# Patient Record
Sex: MALE | Race: WHITE
[De-identification: names, ages, dates, MRNs, and addresses within clinical notes are randomized per-mention and may not be internally consistent; named-entity substitution may affect disease eponyms.]

---

## 2022-12-25 ENCOUNTER — HOSPITAL ENCOUNTER (INPATIENT)
Dept: HOSPITAL 76 - ED | Age: 32
LOS: 4 days | Discharge: HOME | DRG: 341 | End: 2022-12-29
Attending: SURGERY | Admitting: SURGERY
Payer: COMMERCIAL

## 2022-12-25 DIAGNOSIS — F32.A: ICD-10-CM

## 2022-12-25 DIAGNOSIS — Z79.899: ICD-10-CM

## 2022-12-25 DIAGNOSIS — Z20.822: ICD-10-CM

## 2022-12-25 DIAGNOSIS — G47.30: ICD-10-CM

## 2022-12-25 DIAGNOSIS — M54.9: ICD-10-CM

## 2022-12-25 DIAGNOSIS — I10: ICD-10-CM

## 2022-12-25 DIAGNOSIS — G89.29: ICD-10-CM

## 2022-12-25 DIAGNOSIS — J81.0: ICD-10-CM

## 2022-12-25 DIAGNOSIS — F41.9: ICD-10-CM

## 2022-12-25 DIAGNOSIS — K35.80: Primary | ICD-10-CM

## 2022-12-25 LAB
ALBUMIN DIAFP-MCNC: 4.3 G/DL (ref 3.2–5.5)
ALBUMIN/GLOB SERPL: 1.1 {RATIO} (ref 1–2.2)
ALP SERPL-CCNC: 90 IU/L (ref 42–121)
ALT SERPL W P-5'-P-CCNC: 68 IU/L (ref 10–60)
ANION GAP SERPL CALCULATED.4IONS-SCNC: 8 MMOL/L (ref 6–13)
AST SERPL W P-5'-P-CCNC: 32 IU/L (ref 10–42)
BASOPHILS NFR BLD AUTO: 0 10^3/UL (ref 0–0.1)
BASOPHILS NFR BLD AUTO: 0.6 %
BILIRUB BLD-MCNC: 0.6 MG/DL (ref 0.2–1)
BUN SERPL-MCNC: 13 MG/DL (ref 6–20)
CALCIUM UR-MCNC: 8.9 MG/DL (ref 8.5–10.3)
CHLORIDE SERPL-SCNC: 102 MMOL/L (ref 101–111)
CLARITY UR REFRACT.AUTO: CLEAR
CO2 SERPL-SCNC: 27 MMOL/L (ref 21–32)
CREAT SERPLBLD-SCNC: 0.7 MG/DL (ref 0.6–1.2)
EOSINOPHIL # BLD AUTO: 0.3 10^3/UL (ref 0–0.7)
EOSINOPHIL NFR BLD AUTO: 3.7 %
ERYTHROCYTE [DISTWIDTH] IN BLOOD BY AUTOMATED COUNT: 12.8 % (ref 12–15)
GFRSERPLBLD MDRD-ARVRAT: 130 ML/MIN/{1.73_M2} (ref 89–?)
GLOBULIN SER-MCNC: 3.8 G/DL (ref 2.1–4.2)
GLUCOSE SERPL-MCNC: 106 MG/DL (ref 70–100)
GLUCOSE UR QL STRIP.AUTO: NEGATIVE MG/DL
HCT VFR BLD AUTO: 41.1 % (ref 42–52)
HGB UR QL STRIP: 14.1 G/DL (ref 14–18)
KETONES UR QL STRIP.AUTO: NEGATIVE MG/DL
LIPASE SERPL-CCNC: 35 U/L (ref 22–51)
LYMPHOCYTES # SPEC AUTO: 2 10^3/UL (ref 1.5–3.5)
LYMPHOCYTES NFR BLD AUTO: 29.2 %
MCH RBC QN AUTO: 29.4 PG (ref 27–31)
MCHC RBC AUTO-ENTMCNC: 34.3 G/DL (ref 32–36)
MCV RBC AUTO: 85.8 FL (ref 80–94)
MONOCYTES # BLD AUTO: 0.6 10^3/UL (ref 0–1)
MONOCYTES NFR BLD AUTO: 8.3 %
NEUTROPHILS # BLD AUTO: 4 10^3/UL (ref 1.5–6.6)
NEUTROPHILS # SNV AUTO: 7 X10^3/UL (ref 4.8–10.8)
NEUTROPHILS NFR BLD AUTO: 57.5 %
NITRITE UR QL STRIP.AUTO: NEGATIVE
NRBC # BLD AUTO: 0 /100WBC
NRBC # BLD AUTO: 0 X10^3/UL
PDW BLD AUTO: 9.8 FL (ref 7.4–11.4)
PH UR STRIP.AUTO: 7 PH (ref 5–7.5)
PLATELET # BLD: 205 10^3/UL (ref 130–450)
POTASSIUM SERPL-SCNC: 3.6 MMOL/L (ref 3.5–5)
PROT SPEC-MCNC: 8.1 G/DL (ref 6.7–8.2)
PROT UR STRIP.AUTO-MCNC: NEGATIVE MG/DL
RBC # UR STRIP.AUTO: NEGATIVE /UL
RBC MAR: 4.79 10^6/UL (ref 4.7–6.1)
SODIUM SERPLBLD-SCNC: 137 MMOL/L (ref 135–145)
SP GR UR STRIP.AUTO: 1.01 (ref 1–1.03)
UROBILINOGEN UR QL STRIP.AUTO: (no result) E.U./DL
UROBILINOGEN UR STRIP.AUTO-MCNC: NEGATIVE MG/DL

## 2022-12-25 PROCEDURE — 96365 THER/PROPH/DIAG IV INF INIT: CPT

## 2022-12-25 PROCEDURE — 81003 URINALYSIS AUTO W/O SCOPE: CPT

## 2022-12-25 PROCEDURE — 96372 THER/PROPH/DIAG INJ SC/IM: CPT

## 2022-12-25 PROCEDURE — 87086 URINE CULTURE/COLONY COUNT: CPT

## 2022-12-25 PROCEDURE — 80053 COMPREHEN METABOLIC PANEL: CPT

## 2022-12-25 PROCEDURE — 96374 THER/PROPH/DIAG INJ IV PUSH: CPT

## 2022-12-25 PROCEDURE — 71046 X-RAY EXAM CHEST 2 VIEWS: CPT

## 2022-12-25 PROCEDURE — 36600 WITHDRAWAL OF ARTERIAL BLOOD: CPT

## 2022-12-25 PROCEDURE — 99285 EMERGENCY DEPT VISIT HI MDM: CPT

## 2022-12-25 PROCEDURE — 87635 SARS-COV-2 COVID-19 AMP PRB: CPT

## 2022-12-25 PROCEDURE — 81001 URINALYSIS AUTO W/SCOPE: CPT

## 2022-12-25 PROCEDURE — 99284 EMERGENCY DEPT VISIT MOD MDM: CPT

## 2022-12-25 PROCEDURE — 0DTJ4ZZ RESECTION OF APPENDIX, PERCUTANEOUS ENDOSCOPIC APPROACH: ICD-10-PCS | Performed by: SURGERY

## 2022-12-25 PROCEDURE — 83690 ASSAY OF LIPASE: CPT

## 2022-12-25 PROCEDURE — 85025 COMPLETE CBC W/AUTO DIFF WBC: CPT

## 2022-12-25 PROCEDURE — 74177 CT ABD & PELVIS W/CONTRAST: CPT

## 2022-12-25 PROCEDURE — 71045 X-RAY EXAM CHEST 1 VIEW: CPT

## 2022-12-25 PROCEDURE — 96375 TX/PRO/DX INJ NEW DRUG ADDON: CPT

## 2022-12-25 PROCEDURE — 36415 COLL VENOUS BLD VENIPUNCTURE: CPT

## 2022-12-25 PROCEDURE — 82803 BLOOD GASES ANY COMBINATION: CPT

## 2022-12-25 NOTE — ANESTHESIA
Pre-Anesthesia VS, & Labs





- Diagnosis





acute appendicitis





- Procedure





Lap Appy


Vital Signs: 





                                        











Temp Pulse Resp BP Pulse Ox O2 Flow Rate


 


 36.8 C   79   18   151/93 H  99    


 


 12/25/22 18:52  12/25/22 18:52  12/25/22 18:52  12/25/22 18:52  12/25/22 18:52 

 











Height: 5 ft 9 in


Weight (kg): 142.428 kg


Body Mass Index: 46.3


BMI Classification: Morbidly Obese





- NPO


>8 hours





- Lab Results


Current Lab Results: 





Laboratory Tests





12/25/22 12:20: Sodium 137, Potassium 3.6, Chloride 102, Carbon Dioxide 27, 

Anion Gap 8.0, BUN 13, Creatinine 0.7, Estimated GFR (MDRD) 130, Glucose 106 H, 

Calcium 8.9, Total Bilirubin 0.6, AST 32, ALT 68 H, Alkaline Phosphatase 90, 

Total Protein 8.1, Albumin 4.3, Globulin 3.8, Albumin/Globulin Ratio 1.1, Lipase

35


12/25/22 12:20: WBC 7.0, RBC 4.79, Hgb 14.1, Hct 41.1 L, MCV 85.8, MCH 29.4, 

MCHC 34.3, RDW 12.8, Plt Count 205, MPV 9.8, Neut # (Auto) 4.0, Lymph # (Auto) 

2.0, Mono # (Auto) 0.6, Eos # (Auto) 0.3, Baso # (Auto) 0.0, Absolute Nucleated 

RBC 0.00, Nucleated RBC % 0.0








Lab results reviewed: Yes


Fish Bones: 


                                 12/25/22 12:20





                                 12/25/22 12:20





Home Medications and Allergies


Home Medications: 


Ambulatory Orders





Escitalopram [Lexapro] 10 mg PO DAILY 12/25/22 


amLODIPine [Norvasc] 5 mg PO DAILY 12/25/22 











                                        





Escitalopram [Lexapro] 10 mg PO DAILY 12/25/22 


amLODIPine [Norvasc] 5 mg PO DAILY 12/25/22 








Allergies/Adverse Reactions: 


                                    Allergies











Allergy/AdvReac Type Severity Reaction Status Date / Time


 


No Known Drug Allergies Allergy   Verified 12/25/22 12:04














Anes History & Medical History





- Anesthetic History


Family history of Anesthesia Complications: Denies


Family history of Malignant Hyperthermia: Denies





- Medical History


Cardiovascular: reports: Hypertension


Pulmonary: reports: Sleep apnea, CPAP use


Gastrointestinal: reports: Other (fatty liver)


Urinary: reports: None


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


Musculoskeletal: reports: Chronic back pain


Endocrine/Autoimmune: reports: None


Blood Disorders: reports: None


Skin: reports: None


Smoking Status: Current some day smoker (smokes cigars 3-4 times per week.)


Psychosocial: reports: Depression


History of Cancer?: No


Other Past Medical History: fatty liver, "mass" on cerebellum





- Surgical History


General: reports: Colonoscopy


Other Past Surgical History: wisdom teeth





Exam


General: Alert, Oriented x3, Cooperative, No acute distress


Dental: WNL


Mouth Opening: 3 Fingerbreadth


Neck Mobility: Normal


Mallampati classification: III


Thyromental Distance: 4-6 cm


Mental/Cognitive Status: Alert/Oriented X3, Normal for patient





Plan


Anesthesia Type: General


Consent for Procedure(s) Verified and Reviewed: Yes


Code Status: Attempt Resuscitation


ASA classification: 3-Severe systemic disease


Is this case an emergency?: Yes

## 2022-12-25 NOTE — ED PHYSICIAN DOCUMENTATION
History of Present Illness





- Stated complaint


Stated Complaint: ABD PX





- Chief complaint


Chief Complaint: Abd Pain





- Additonal information


Additional information: 





32-year-old male presents emergency department for evaluation of 24 hours pain 

in his right lower quadrant with some radiation to the right groin and testicle.

 He has had some mild diarrhea.  Some nausea no vomiting.  No fevers.  No 

urinary symptoms or hematuria.  No history of similar.  No pertinent past 

surgical history.





Patient does have a history of hypertension for which he takes medication as 

well as Lexapro for anxiety/depression





Review of Systems


Constitutional: reports: Reviewed and negative


Throat: reports: Reviewed and negative


Cardiac: reports: Reviewed and negative


Respiratory: reports: Reviewed and negative


GI: reports: Abdominal Pain, Nausea.  denies: Vomiting


: reports: Reviewed and negative


Skin: reports: Reviewed and negative





PD PAST MEDICAL HISTORY





- Present Medications


Home Medications: 


                                Ambulatory Orders











 Medication  Instructions  Recorded  Confirmed


 


Escitalopram [Lexapro] 10 mg PO DAILY 12/25/22 12/25/22


 


HYDROcod/ACETAM 5/325 [Norco 5/325] 1 each PO Q6H PRN #25 tablet 12/25/22 


 


amLODIPine [Norvasc] 5 mg PO DAILY 12/25/22 12/25/22














- Allergies


Allergies/Adverse Reactions: 


                                    Allergies











Allergy/AdvReac Type Severity Reaction Status Date / Time


 


No Known Drug Allergies Allergy   Verified 12/25/22 12:04














PD ED PE NORMAL





- General


General: Alert and oriented X 3, No acute distress





- HEENT


HEENT: PERRL





- Cardiac


Cardiac: RRR, No murmur





- Respiratory


Respiratory: No respiratory distress, Clear bilaterally





- Abdomen


Abdomen: Normal bowel sounds, Soft.  No: Non tender (Mild tenderness elicited 

very low in the right lower quadrant without guarding or rebound.  Unremarkable 

male  exam.  Abdominal exam was limited somewhat by body habitus)





- Back


Back: No CVA TTP, No spinal TTP





- Derm


Derm: Normal color, Warm and dry





- Extremities


Extremities: No deformity, No tenderness to palpate





- Neuro


Neuro: Alert and oriented X 3, CNs 2-12 intact


Eye Opening: Spontaneous


Motor: Obeys Commands


Verbal: Oriented


GCS Score: 15





Results





- Vitals


Vitals: 


                               Vital Signs - 24 hr











  12/25/22 12/25/22





  12:00 15:37


 


Temperature 36.8 C 


 


Heart Rate 82 80


 


Respiratory 18 18





Rate  


 


Blood Pressure 159/93 H 166/97 H


 


O2 Saturation 99 100








                                     Oxygen











O2 Source                      Room air

















- Labs


Labs: 


                                Laboratory Tests











  12/25/22 12/25/22 12/25/22





  12:06 12:20 12:20


 


WBC   7.0 


 


RBC   4.79 


 


Hgb   14.1 


 


Hct   41.1 L 


 


MCV   85.8 


 


MCH   29.4 


 


MCHC   34.3 


 


RDW   12.8 


 


Plt Count   205 


 


MPV   9.8 


 


Neut # (Auto)   4.0 


 


Lymph # (Auto)   2.0 


 


Mono # (Auto)   0.6 


 


Eos # (Auto)   0.3 


 


Baso # (Auto)   0.0 


 


Absolute Nucleated RBC   0.00 


 


Nucleated RBC %   0.0 


 


Sodium    137


 


Potassium    3.6


 


Chloride    102


 


Carbon Dioxide    27


 


Anion Gap    8.0


 


BUN    13


 


Creatinine    0.7


 


Estimated GFR (MDRD)    130


 


Glucose    106 H


 


Calcium    8.9


 


Total Bilirubin    0.6


 


AST    32


 


ALT    68 H


 


Alkaline Phosphatase    90


 


Total Protein    8.1


 


Albumin    4.3


 


Globulin    3.8


 


Albumin/Globulin Ratio    1.1


 


Lipase    35


 


Urine Color  YELLOW  


 


Urine Clarity  CLEAR  


 


Urine pH  7.0  


 


Ur Specific Gravity  1.015  


 


Urine Protein  NEGATIVE  


 


Urine Glucose (UA)  NEGATIVE  


 


Urine Ketones  NEGATIVE  


 


Urine Occult Blood  NEGATIVE  


 


Urine Nitrite  NEGATIVE  


 


Urine Bilirubin  NEGATIVE  


 


Urine Urobilinogen  0.2 (NORMAL)  


 


Ur Leukocyte Esterase  NEGATIVE  


 


Ur Microscopic Review  NOT INDICATED  


 


Urine Culture Comments  NOT INDICATED  














- Rads (name of study)


  ** CT abd


Radiology: Final report received (Mild early appendicitis.  No findings of 

perforation or abscess are seen)





PD Medical Decision Making





- ED course


Complexity details: reviewed results, re-evaluated patient, considered 

differential, d/w patient, d/w consultant (Abram)


ED course: 





32-year-old male presents to the emergency department for evaluation of acute 

right lower quadrant abdominal pain with radiation to the right groin and 

testicle.  Symptoms began yesterday.  Some nausea no vomiting.  No fevers.





On exam he does have mildly tender right lower quadrant.  Exam was somewhat 

limited due to body habitus.  We did obtain a CBC and electrolytes here in the 

emergency department and no acute worrisome findings were seen such as 

leukocytosis or renal derangement.





We did proceed to do a CT of the abdomen looking for renal or ureter colic 

versus possible acute appendectomy.  The radiologist is interpreted the findings

 as early appendicitis.  I discussed the CT finding with Dr. Parish Valverde surgeon 

on-call.  The plan is to take the patient to the operating room later this lazaro castillo.  I will administer a dose of Zosyn.  Dr. Valverde has requested that we make a

 prepack of pain medication available to the patient postoperatively which I 

have also prescribed.  Patient is NPO.  Patient is aware of the plan and 

findings.





Further care to be dictated by the surgical team.





Departure





- Departure


Disposition: ED Transfer to [facility]


Clinical Impression: 


Acute appendicitis


Qualifiers:


 Acute appendicitis type: other Qualified Code(s): K35.890 - Other acute 

appendicitis without perforation or gangrene; K35.89 - Other acute appendicitis





Prescriptions: 


HYDROcod/ACETAM 5/325 [Norco 5/325] 1 each PO Q6H PRN #25 tablet


 PRN Reason: Pain

## 2022-12-25 NOTE — HISTORY & PHYSICAL EXAMINATION
Chief Complaint





- Chief Complaint


Chief Complaint: right lower quadrant pain x 24 hours





History of Present Illness





- Admitted From


Admitted From:: ED





- History Obtained From


Records Reviewed: yes


History obtained from: pt


Exam Limitations: none





- History of Present Illness


HPI Comment/Other: 





no n/v.  last pm this am.  ct scan early appendicitis





History





- Past Medical History


Cardiovascular: reports: Hypertension


Respiratory: reports: Sleep apnea, CPAP use


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


Endocrine/Autoimmune: reports: None


GI: reports: Other (fatty liver)


: reports: None


Psych: reports: Depression


Musculoskeletal: reports: Chronic back pain


Derm: reports: None


Other Past Medical History: fatty liver, "mass" on cerebellum





- Past Surgical History


General: reports: Colonoscopy


Other past surgical history: wisdom teeth





- POLST


Patient has POLST: No





Meds/Allgy





- Home Medications


Home Medications: 


                                Ambulatory Orders











 Medication  Instructions  Recorded  Confirmed


 


Escitalopram [Lexapro] 10 mg PO DAILY 12/25/22 12/25/22


 


HYDROcod/ACETAM 5/325 [Norco 5/325] 1 each PO Q6H PRN #25 tablet 12/25/22 


 


amLODIPine [Norvasc] 5 mg PO DAILY 12/25/22 12/25/22














- Allergies


Allergies/Adverse Reactions: 


                                    Allergies











Allergy/AdvReac Type Severity Reaction Status Date / Time


 


No Known Drug Allergies Allergy   Verified 12/25/22 12:04














Review of Systems





- Other Findings


Other Findings: 








sleep apnea.  has cpap at home


10 pt ros as above otherwise unremarkable





Exam





- Vital Signs


Reviewed Vital Signs: Yes


Vital Signs: 





                                Vital Signs x48h











  Temp Pulse Resp BP Pulse Ox


 


 12/25/22 18:52  36.8 C  79  18  151/93 H  99


 


 12/25/22 18:00   72  18  143/89 H  99


 


 12/25/22 15:37   80  18  166/97 H  100


 


 12/25/22 12:00  36.8 C  82  18  159/93 H  99














- Physical Exam


General Appearance: positive: No acute distress, Alert


Eyes Bilateral: positive: PERRL, EOMI


ENT: positive: No signs of dehydration


Neck: positive: No JVD


Respiratory: positive: No respiratory distress, Breath sounds nml


Cardiovascular: positive: Regular rate & rhythm


Abdomen: positive: No distention, Other (minimal rlq tenderness without 

peritonitis)


Neurologic/Psychiatric: positive: Oriented x3





Conclusion/Plan





- Problem List


(1) Acute appendicitis


Conclusion/Plan: 


plan appendectomy.  parq held and consent obtained.


Qualifiers: 


   Acute appendicitis type: other   Qualified Code(s): K35.890 - Other acute 

appendicitis without perforation or gangrene; K35.89 - Other acute appendicitis 

  





- Lab Results


Lab results reviewed: Yes


Fish Bones: 


                                 12/25/22 12:20





                                 12/25/22 12:20

## 2022-12-25 NOTE — OPERATIVE REPORT
Operative Report





- General


Procedure Date: 12/25/22


Planned Procedure: 





lap appendectomy


Pre-Op Diagnosis: acute appendicitis


Procedure Performed: 





lap appendectomy


extra degree difficulty


Post Op Diagnosis: acute appenditis





- Procedure Note


Primary Surgeon: candice yung


Anesthesia Technique: General ET tube, Local


Pathology: 





appendix


Estimated Blood Loss (mL): 5


Drain/Tube Type: Other (none)


Indications: 





appendicitis


Findings: 





retrocecal appendix and adherent back side colon up to the liver and duodenum 

making surgery unusually difficult


Complications: 





none





- Other


Other Information/Narrative: 





The patient was properly identified brought to the operating room and placed in 

supine position.  General endotracheal anesthesia was induced.  Sequential com

pression devices were placed.  He was prepped and draped in a sterile fashion.  

He had previously been given antibiotics 2 hours earlier.  Local anesthetic was 

given to incision areas.  He had mild weakness at his umbilicus however no 

hernia.  Incision was made 3 cm caudad of the umbilicus.  Dissection proceeded 

down to the fascia fascia was incised lifted upwards and abdomen entered with a 

Veress needle.  CO2 was insufflated to a pressure of 15.  A 12 mm trocar was 

placed with 30 degree scope.  There was no evidence of injury from Veress needle

or trocar placement.  Under direct vision a 5 mm trocar was placed suprapubic 

and a 5 mm trocar was placed in the right upper quadrant.  The base of the 

appendix was identified.  Except for 2 cm it was completely retrocecal under the

right colon up to the liver edge and adherent to the duodenum.  The appendix was

approximately 8 cm long or longer.  An hour of time was taken to carefully 

mobilized the appendix from underneath the right colon and off from the 

duodenum.  The mesoappendix was very long given the appendix was 8 to 10 cm 

long.  Some of the mesoappendix was taken down with cautery right at the 

appendix and the more thick mesoappendix was carefully taken down with 3 

vascular loads.  The base of the appendix had previously been divided with an 

Endo QIANA intestinal load.  Appendix was removed.  The abdomen was irrigated.  

Hemostasis was assured.  Trochars were removed and again hemostasis assured.  

CO2 was evacuated and fascia at the infraumbilical site closed with a running 0 

Vicryl suture.  Subcutaneous tissue was irrigated and skin loosely 

reapproximated with buried interrupted or running 4-0 Monocryl subcuticular 

suture.  Dressings were applied.  He tolerated the procedure well was awakened 

and brought to recovery in good condition.

## 2022-12-25 NOTE — CT REPORT
PROCEDURE:  ABDOMEN/PELVIS W

 

INDICATIONS:  RLQ pain radiates to testicle

 

CONTRAST: 100ml omni 300 

 

TECHNIQUE:  

After the administration of IV contrast, 5 mm thick sections acquired from the diaphragms to the symp
hysis.  5 mm thick coronal and sagittal reformats were acquired.  For radiation dose reduction, the f
ollowing was used:  automated exposure control, adjustment of mA and/or kV according to patient size.
  

 

COMPARISON:  None.

 

FINDINGS:  

Image quality:  Excellent.  

 

ABDOMEN:  

Lung bases:  Lung bases are clear.  Heart size is normal.  A pericardial cyst is noted on the right, 
as on series 3 image 5, measuring 2.6 cm.

 

Solid organs:  Diffuse fatty liver infiltration can be seen.  The liver is prominent in size. No foca
l suspicious liver lesions are seen. The spleen is enlarged, measuring 17.2 cm in greatest axial dime
nsion. No adrenal nodules.  Kidneys demonstrate normal size and enhancement, without hydronephrosis. 
 

 

Peritoneum and bowel:  In this patient with this given history, scrutiny is given to the appendix. Th
e appendix is mildly hyperenhancing, with wall thickening, with a maximum caliber of 7 mm. Mild surro
unding inflammatory change can be seen. This can be seen on series 6 images 36 through 41 and on seri
es 3 images 55 through 72. No findings of perforation or abscess can be seen.

No dilated loops of small bowel are seen.

No significant colonic abnormality can be seen.

The stomach is relatively decompressed. 

 

Nodes and vessels:  No retroperitoneal or mesenteric adenopathy by size criteria.  Aorta and inferior
 vena cava are normal in size.   Incidental note is made of a retroaortic left renal vein.    

 

Miscellaneous:  A mild fat-containing periumbilical hernia is seen.  

 

 

PELVIS:  

Genitourinary:  Bladder wall thickness is normal.  

 

Miscellaneous:  No inguinal hernias or adenopathy.  

 

Bones:  No suspicious bony lesions.  No vertebral body compression fractures.  

 

 

 

IMPRESSION:  Mild/early appendicitis.

 

No findings of perforation or abscess are seen.

 

 

 

 

Additional findings: 

Right-sided pericardial cyst

Enlarged, fatty liver

Splenomegaly

Retroaortic left renal vein

Fat-containing periumbilical hernia

 

 

Note: Case discussed by telephone with Belen Martines at 3:03 PM [place] time on 12/25/2022.

 

Reviewed by: Shin Sims MD on 12/25/2022 3:04 PM [place]

Approved by: Shin Sims MD on 12/25/2022 3:04 PM [facility]

 

 

Station ID:  [id number]

## 2022-12-25 NOTE — XRAY REPORT
PROCEDURE:  Chest 1 View X-Ray

 

INDICATIONS:  coughing blood.  rule out pulmonary edema

 

TECHNIQUE:  One view of the chest was acquired.  

 

COMPARISON:  None.

 

FINDINGS:  

 

Surgical changes and devices:  None.  

 

Lungs and pleura:  No pleural effusions or pneumothorax.  Lungs are clear.  

 

Mediastinum:  Mediastinal contours appear normal.  Heart size is enlarged.

 

Bones and chest wall:  No suspicious bony lesions.  Overlying soft tissues appear unremarkable.  

 

 

IMPRESSION:  

 

No acute pulmonary process.

 

Reviewed by: Lala Mark MD on 12/25/2022 10:38 PM PST

Approved by: Lala Mark MD on 12/25/2022 10:38 PM PST

 

 

Station ID:  [id number]

## 2022-12-25 NOTE — CONSULTATION NOTE
Consultation Report: 





Emergence note: TOF 4/4 and reversed with 2mg/Kg sugammadex. Patient was taking 

adequate TV >500ml and was reaching towards ETT and attempting to sit up. 

Patient was extubated and placed on 10L O2 via mask. Immediately after 

extubation, he had snoring respirations and maintaining airway patency became 

difficult despite jaw lift and positive pressure.  His O2 sats dropped into the 

70s. A 32FR nasal airway (right nare) and a 100mm oral airway were inserted and 

airway patency was verified. Patient was placed on PS 15, and PEEP of 5. With 

airway patent (TV>500ml and ETCO2 present) continued to have difficulty 

maintaining O2 saturations (High 80s). Patient would not arouse so narcan 0.4mg 

was given IV. Patient's mentation improved and airways were removed when he 

could follow directions. He was able to maintain airway patency but began 

coughing blood tinged sputum. Upon arrival to the PACU, patient was alert, 

conversing but continued to cough up blood tinged sputum. Right nare was sprayed

with oxymetazoline nasal spray x3 for suspected bleeding. O2 sats improved to 

low 90s with nasal cannula in PACU.  Bilateral breath sounds were equal with 

rhonchi throughout all lung fields. Chest xray was obtained and was normal 

except for an enlarged heart. Case discussed with Dr. Valverde and patient will be

staying overnight for observation. Discussed events with patient and patient's 

spouse. I advised he see a PCP ASAP for severe sleep apnea followup.

## 2022-12-26 LAB
ARTERIAL PATENCY WRIST A: POSITIVE
BASE EXCESS BLDMV CALC-SCNC: -0.5 MMOL/L (ref -2–3)
CO2 BLDA CALC-SCNC: 25.7 MMOL/L (ref 21–29)
DEPRECATED HCO3 PLAS-SCNC: 24.4 MMOL/L (ref 22–26)
PCO2 TEMP ADJ BLDCOA: 41 MMHG (ref 34–45)
PH TEMP ADJ BLDA: 7.39 [PH] (ref 7.35–7.45)
PO2 TEMP ADJ BLDCOA: 57 MMHG (ref 80–100)
SAO2 % BLDA FROM PO2: 91 % (ref 94–98)

## 2022-12-26 RX ADMIN — IBUPROFEN PRN MG: 400 TABLET, FILM COATED ORAL at 21:16

## 2022-12-26 RX ADMIN — FAMOTIDINE SCH MG: 20 TABLET, FILM COATED ORAL at 21:16

## 2022-12-26 RX ADMIN — HYDROMORPHONE HYDROCHLORIDE PRN MG: 1 INJECTION, SOLUTION INTRAMUSCULAR; INTRAVENOUS; SUBCUTANEOUS at 02:24

## 2022-12-26 RX ADMIN — ACETAMINOPHEN PRN MG: 325 TABLET ORAL at 21:16

## 2022-12-26 RX ADMIN — HYDROMORPHONE HYDROCHLORIDE PRN MG: 1 INJECTION, SOLUTION INTRAMUSCULAR; INTRAVENOUS; SUBCUTANEOUS at 08:45

## 2022-12-26 RX ADMIN — HYDROCODONE BITARTRATE AND ACETAMINOPHEN PRN TAB: 5; 325 TABLET ORAL at 12:59

## 2022-12-26 NOTE — PROVIDER PROGRESS NOTE
Subjective





- Subjective


Pt reports feeling: Improved (breathing improved.  no appetite)





Objective





- Vital Signs/Intake & Output


Reviewed Vital Signs: Yes


Vital Signs: 


                                Vital Signs x48h











  Temp Pulse Pulse Resp BP BP Pulse Ox


 


 12/26/22 11:16  37.0 C   88  22   123/65  94


 


 12/26/22 09:40       


 


 12/26/22 07:47  37.5 C  105 H   24  118/65   86 L














  O2 Flow Rate


 


 12/26/22 11:16  15


 


 12/26/22 09:40  15


 


 12/26/22 07:47 











Intake & Output: 


                                 Intake & Output











 12/23/22 12/24/22 12/25/22 12/26/22





 23:59 23:59 23:59 23:59


 


Intake Total   1100 


 


Balance   1100 














- Objective


General Appearance: positive: No acute distress, Alert


Eyes Bilateral: positive: PERRL, EOMI


Neck: positive: No JVD, Trachea midline


Respiratory: positive: No respiratory distress


Abdomen: positive: No distention, Other (benign abdomen)


Neurologic/Psychiatric: positive: Oriented x3





- Lab Results


Fish Bones: 


                                 12/25/22 12:20





                                 12/25/22 12:20


Other Labs: 


                               Lab Results x24hrs











  12/26/22 12/25/22 Range/Units





  08:40 17:40 


 


Bld Gas Analysis Time  0847   


 


Sample Site  RIGHT RADIAL   


 


ABG pH  7.39   (7.35-7.45)  


 


ABG pCO2  41   (34-45)  mmHg


 


ABG pO2  57 L   ()  mmHg


 


ABG HCO3  24.4   (22.0-26.0)  mmol/L


 


ABG Total CO2  25.7   (21.0-29.0)  MMOL/L


 


ABG O2 Saturation  91 L   (94-98)  %


 


ABG Base Excess  -0.5   (-2.0-3.0)  mmol/L


 


Otilio Test  POSITIVE   


 


O2 Delivery Device  OXYMIZER   


 


O2 Liters/Min  15.00   LPM


 


SARS-CoV-2 (PCR)   NOT DETECTED  














Assessment/Plan





- Problem List


(1) Acute appendicitis


Impression: 


low sats post surgery with low lung volumes and copious secretions.


slowly improving


encouraged out of bed





diet as tolerated


home when RA sats 88 to 90 %





he has very significant sleep apnea and may have had mild negative pressure 

pulmonary edema although not seen on cxr


Qualifiers: 


   Acute appendicitis type: other   Qualified Code(s): K35.890 - Other acute 

appendicitis without perforation or gangrene; K35.89 - Other acute appendicitis

## 2022-12-27 RX ADMIN — HYDROCODONE BITARTRATE AND ACETAMINOPHEN PRN TAB: 5; 325 TABLET ORAL at 13:07

## 2022-12-27 RX ADMIN — ESCITALOPRAM OXALATE SCH MG: 10 TABLET ORAL at 09:02

## 2022-12-27 RX ADMIN — FAMOTIDINE SCH MG: 20 TABLET, FILM COATED ORAL at 21:22

## 2022-12-27 RX ADMIN — FAMOTIDINE SCH MG: 20 TABLET, FILM COATED ORAL at 09:02

## 2022-12-27 RX ADMIN — IBUPROFEN PRN MG: 400 TABLET, FILM COATED ORAL at 17:21

## 2022-12-27 RX ADMIN — HYDROCODONE BITARTRATE AND ACETAMINOPHEN PRN TAB: 5; 325 TABLET ORAL at 02:27

## 2022-12-27 RX ADMIN — ENOXAPARIN SODIUM SCH MG: 100 INJECTION SUBCUTANEOUS at 21:22

## 2022-12-27 RX ADMIN — ACETAMINOPHEN PRN MG: 325 TABLET ORAL at 21:22

## 2022-12-27 RX ADMIN — ACETAMINOPHEN PRN MG: 325 TABLET ORAL at 17:21

## 2022-12-27 RX ADMIN — IBUPROFEN PRN MG: 400 TABLET, FILM COATED ORAL at 05:57

## 2022-12-27 RX ADMIN — ACETAMINOPHEN PRN MG: 325 TABLET ORAL at 05:56

## 2022-12-27 NOTE — PROVIDER PROGRESS NOTE
Subjective





- Subjective


Subjective: 





patient and/or family are requesting transfer to [facility]





Objective





- Vital Signs/Intake & Output


Vital Signs: 


                                Vital Signs x48h











  Temp Pulse Resp BP Pulse Ox O2 Flow Rate


 


 12/27/22 15:56  37.8 C  101 H  22  140/74 H  92  10


 


 12/27/22 14:21       13


 


 12/27/22 13:46      92  10


 


 12/27/22 13:16  36.5 C  101 H  20  124/66  92  10


 


 12/27/22 08:49  36.9 C  100  18  139/70 H  95  15











Intake & Output: 


                                 Intake & Output











 12/24/22 12/25/22 12/26/22 12/27/22





 23:59 23:59 23:59 23:59


 


Intake Total  9946 139 4361


 


Output Total    1400


 


Balance  1100 890 -110














- Lab Results


Fish Bones: 


                                 12/25/22 12:20





                                 12/25/22 12:20





Assessment/Plan





- Problem List


(1) Acute appendicitis


Impression: 


improving.  benign abdomen tolerating diet.  afebrile





clinically he had postop negative pressure pulmonary edema with pink frothy 

secretions and low sats.  his bmi is 46 and he has severe sleep apnea.  he is 

improving with standard supportive measures.


request has been made for transfer to [facility] where pulmonary doctors are 

available.





I called and discussed transfer with surgery and house supervisor.  surgery felt

he did not need to be transferred to surgery.  medicine would be better.





House supervisor informed me they have 113 patients in the ED and 40 are boardin

g/ waiting for a room.  transfer is currently not possible to [facility].


I have asked [facility] nurse to inform him transfer is not possible due to no 

beds available.





He is improving.


I do not recommend he leave until room air sats >88 %.


Qualifiers: 


   Acute appendicitis type: other   Qualified Code(s): K35.890 - Other acute 

appendicitis without perforation or gangrene; K35.89 - Other acute appendicitis

## 2022-12-27 NOTE — PROVIDER PROGRESS NOTE
Subjective





- Prog Note Date


Prog Note Date: 12/27/22





- Subjective


Pt reports feeling: Improved (breathing improved and appetite improved.)





Objective





- Vital Signs/Intake & Output


Reviewed Vital Signs: Yes


Vital Signs: 


                                Vital Signs x48h











  Temp Pulse Resp BP Pulse Ox O2 Flow Rate


 


 12/27/22 08:49  36.9 C  100  18  139/70 H  95  15


 


 12/27/22 05:07  37.8 C  98  22  127/66  94  15











Intake & Output: 


                                 Intake & Output











 12/24/22 12/25/22 12/26/22 12/27/22





 23:59 23:59 23:59 23:59


 


Intake Total  1100 890 810


 


Output Total    1400


 


Balance  1100 890 -590














- Objective


General Appearance: positive: No acute distress, Alert


Eyes Bilateral: positive: PERRL, EOMI


Respiratory: positive: No respiratory distress


Abdomen: positive: Non-tender, No distention


Neurologic/Psychiatric: positive: Oriented x3





- Lab Results


Fish Bones: 


                                 12/25/22 12:20





                                 12/25/22 12:20





Assessment/Plan





- Problem List


(1) Acute appendicitis


Impression: 


low sats and significant pulmonary secretions following surgery.  improving.





home when room air sats > 88%


d/c ivf otherwise continue present care and respiratory care per RT


Qualifiers: 


   Acute appendicitis type: other   Qualified Code(s): K35.890 - Other acute 

appendicitis without perforation or gangrene; K35.89 - Other acute appendicitis

## 2022-12-28 RX ADMIN — ACETAMINOPHEN PRN MG: 325 TABLET ORAL at 20:52

## 2022-12-28 RX ADMIN — HYDROCODONE BITARTRATE AND ACETAMINOPHEN PRN TAB: 5; 325 TABLET ORAL at 10:31

## 2022-12-28 RX ADMIN — IBUPROFEN PRN MG: 400 TABLET, FILM COATED ORAL at 00:03

## 2022-12-28 RX ADMIN — IBUPROFEN PRN MG: 400 TABLET, FILM COATED ORAL at 14:10

## 2022-12-28 RX ADMIN — ESCITALOPRAM OXALATE SCH MG: 10 TABLET ORAL at 09:01

## 2022-12-28 RX ADMIN — IBUPROFEN PRN MG: 400 TABLET, FILM COATED ORAL at 20:51

## 2022-12-28 RX ADMIN — FAMOTIDINE SCH MG: 20 TABLET, FILM COATED ORAL at 20:51

## 2022-12-28 RX ADMIN — FAMOTIDINE SCH MG: 20 TABLET, FILM COATED ORAL at 09:01

## 2022-12-28 RX ADMIN — ACETAMINOPHEN PRN MG: 325 TABLET ORAL at 14:10

## 2022-12-28 RX ADMIN — IBUPROFEN PRN MG: 400 TABLET, FILM COATED ORAL at 06:33

## 2022-12-28 RX ADMIN — ENOXAPARIN SODIUM SCH MG: 100 INJECTION SUBCUTANEOUS at 09:59

## 2022-12-28 RX ADMIN — ACETAMINOPHEN PRN MG: 325 TABLET ORAL at 06:33

## 2022-12-28 NOTE — PROVIDER PROGRESS NOTE
Subjective





- Subjective


Pt reports feeling: Improved


Subjective: 





sitting in chair.  appears well.  still having chest discomfort however improved





Objective





- Vital Signs/Intake & Output


Reviewed Vital Signs: Yes


Vital Signs: 


                                Vital Signs x48h











  Temp Pulse Resp BP Pulse Ox O2 Flow Rate


 


 12/28/22 08:58  37.3 C  89  16  124/68  96 


 


 12/28/22 08:35       10


 


 12/28/22 05:00  37.0 C  88  20  135/73 H  98  10











Intake & Output: 


                                 Intake & Output











 12/25/22 12/26/22 12/27/22 12/28/22





 23:59 23:59 23:59 23:59


 


Intake Total 3915 988 6883 790


 


Output Total   1400 


 


Balance 1100 890 640 790














- Objective


General Appearance: positive: No acute distress, Alert


Eyes Bilateral: positive: PERRL, EOMI, No scleral icterus


Neck: positive: No JVD, Trachea midline


Respiratory: positive: No respiratory distress


Abdomen: positive: Non-tender, No distention


Neurologic/Psychiatric: positive: Oriented x3





- Lab Results


Fish Bones: 


                                 12/25/22 12:20





                                 12/25/22 12:20





Assessment/Plan





- Problem List


(1) Acute appendicitis


Impression: 


he had classic findings on exam and symptoms of immediate postop negative 

pressure pulmonary edema.  he is improving with supportive care.


benign abdomen.  tolerating diet.


I do not believe additional work up would be beneficial.





home when comfortable ambulating and room air sats >88 to 90


Qualifiers: 


   Acute appendicitis type: other   Qualified Code(s): K35.890 - Other acute 

appendicitis without perforation or gangrene; K35.89 - Other acute appendicitis

## 2022-12-28 NOTE — XRAY REPORT
PROCEDURE:  Chest 2 View X-Ray

 

INDICATIONS:  clinically neg pressure pulmonary edema postop

 

TECHNIQUE:  2 views of the chest were acquired.  

 

COMPARISON:  Single view the chest dated 12/25/2022

 

FINDINGS:  

 

Surgical changes and devices:  None.  

 

Lungs and pleura: There are new bilateral patchy perihilar radiopacities when compared with the study
 from 12/25/2022. No pleural effusion or pneumothorax.

 

Mediastinum:  Mediastinal contours are normal.  Heart size is enlarged, as before.

 

Bones and chest wall:  No suspicious bony abnormalities.  Soft tissues appear unremarkable.  

 

IMPRESSION:  

Cardiomegaly and patchy perihilar pulmonary radiopacities. Differential considerations include pulmon
nabil edema, early ARDS, or multifocal infection.

 

Reviewed by: Paige Bell MD on 12/28/2022 10:38 AM PST

Approved by: Paige Bell MD on 12/28/2022 10:38 AM [facility]

 

 

Station ID:  [id number]

## 2022-12-29 VITALS — DIASTOLIC BLOOD PRESSURE: 77 MMHG | SYSTOLIC BLOOD PRESSURE: 158 MMHG

## 2022-12-29 RX ADMIN — HYDROCODONE BITARTRATE AND ACETAMINOPHEN PRN TAB: 5; 325 TABLET ORAL at 00:09

## 2022-12-29 RX ADMIN — ENOXAPARIN SODIUM SCH MG: 100 INJECTION SUBCUTANEOUS at 08:59

## 2022-12-29 RX ADMIN — ESCITALOPRAM OXALATE SCH MG: 10 TABLET ORAL at 08:59

## 2022-12-29 RX ADMIN — FAMOTIDINE SCH MG: 20 TABLET, FILM COATED ORAL at 08:59

## 2022-12-29 NOTE — DISCHARGE SUMMARY
Discharge Summary


Admit Date: 12/25/22


Discharge Date: 12/29/22


Discharging Provider: candice yung


Code Status: Attempt Resuscitation


Discharge Facility Name: [facility]





- DIAGNOSES


Admission Diagnoses: 





appendicitis


Discharge Diagnoses with Status of Each Condition: 





appendicitis





postop negative pressure pulmonary edema requiring oxygen support and pulmonary 

toilet





home in good condition 12/29/2022





- HPI


History of Present Illness: 





1 day of abdominal pain and acute appendicitis.  hx severe sleep apnea.  bmi 

46.6.  postop negative pressure pulmonary edema requiring oxygen for a few days





- CONSULTS | PROCEDURES


Consultations: none


Procedures: 





lap appy and respiratory therapy





- HOSPITAL COURSE


Hospital Course: 





as above





- ALLERGIES


Allergies/Adverse Reactions: 


                                    Allergies











Allergy/AdvReac Type Severity Reaction Status Date / Time


 


No Known Drug Allergies Allergy   Verified 12/25/22 12:04














- MEDICATIONS


Home Medications: 


                                Ambulatory Orders











 Medication  Instructions  Recorded  Confirmed


 


Escitalopram [Lexapro] 10 mg PO DAILY 12/25/22 12/25/22


 


HYDROcod/ACETAM 5/325 [Norco 5/325] 1 each PO Q6H PRN #25 tablet 12/25/22 


 


amLODIPine [Norvasc] 5 mg PO DAILY 12/25/22 12/25/22














- PHYSICAL EXAM AT DISCHARGE


General Appearance: positive: No acute distress, Alert


Eyes Bilateral: positive: Normal inspection, EOMI


ENT: positive: No signs of dehydration


Neck: positive: No JVD, Trachea midline


Respiratory: positive: No respiratory distress


Abdomen: positive: Non-tender, No distention


Neurologic/Psychiatric: positive: Oriented x3





- LABS


Result Diagrams: 


                                 12/25/22 12:20





                                 12/25/22 12:20





- FOLLOW UP


Follow Up: 





candice yung md





[phone]

## 2022-12-29 NOTE — DISCHARGE PLAN
Discharge Plan


Problem Reviewed?: Yes


Disposition: 01 Home, Self Care


Condition: Good


Prescriptions: 


HYDROcod/ACETAM 5/325 [Norco 5/325] 1 each PO Q6H PRN #25 tablet


 PRN Reason: Pain


Diet: Regular


Activity Restrictions: No Restrictions


Shower Restrictions: No


Driving Restrictions: No


Health Concerns: 


recent appendicitis and postop negative pressure pulmonary edema


Assessment: 


doing well 12/29/2022


Additional Instructions or Follow Up instructions: 


call the surgery office for a follow up appointment and any concerns





[phone]


No Smoking: If you smoke, Please STOP!  Call [phone] for help.

## 2022-12-30 NOTE — ANESTHESIA POST OP EVALUATION
Anesthesia Post Eval





- Post Anesthesia Eval


Vitals: 





                                Last Vital Signs











Temp  37.2 C   12/29/22 18:20


 


Pulse  88   12/29/22 18:20


 


Resp  18   12/29/22 18:20


 


BP  158/77 H  12/29/22 18:20


 


Pulse Ox  98   12/29/22 16:23


 


O2 Flow Rate  1   12/29/22 13:00











CV Function Including HR & BP: Stable


Pain Control: Satisfactory


Nausea & Vomiting: Negative


Mental Status: Baseline


Respiratory Status: Other


Hydration Status: Satisfactory


Anesthesia Complications: Other (suspect negative pressure pulmonary edema and 

severe sleep apnea. Supportive care until resolved.)





- Other Details/Therapies


Other Details/Therapies: 





See note regarding extubation/emergence. Patient alert, conversing but requires 

O2 via NC to maintain saturation.